# Patient Record
Sex: FEMALE | Race: WHITE | NOT HISPANIC OR LATINO
[De-identification: names, ages, dates, MRNs, and addresses within clinical notes are randomized per-mention and may not be internally consistent; named-entity substitution may affect disease eponyms.]

---

## 2017-06-20 ENCOUNTER — RX ONLY (RX ONLY)
Age: 67
End: 2017-06-20

## 2017-09-28 ENCOUNTER — RX ONLY (RX ONLY)
Age: 67
End: 2017-09-28

## 2018-08-27 ENCOUNTER — APPOINTMENT (OUTPATIENT)
Dept: URBAN - METROPOLITAN AREA CLINIC 186 | Age: 68
Setting detail: DERMATOLOGY
End: 2018-08-27

## 2018-08-27 DIAGNOSIS — L71.8 OTHER ROSACEA: ICD-10-CM

## 2018-08-27 PROCEDURE — OTHER COUNSELING: OTHER

## 2018-08-27 PROCEDURE — OTHER PRESCRIPTION: OTHER

## 2018-08-27 PROCEDURE — 99212 OFFICE O/P EST SF 10 MIN: CPT

## 2018-08-27 PROCEDURE — OTHER ADDITIONAL NOTES: OTHER

## 2018-08-27 RX ORDER — METRONIDAZOLE 7.5 MG/G
CREAM TOPICAL
Qty: 1 | Refills: 5 | Status: ERX | COMMUNITY
Start: 2018-08-27

## 2018-08-27 ASSESSMENT — LOCATION DETAILED DESCRIPTION DERM
LOCATION DETAILED: LEFT MEDIAL MALAR CHEEK
LOCATION DETAILED: RIGHT CENTRAL MALAR CHEEK

## 2018-08-27 ASSESSMENT — LOCATION SIMPLE DESCRIPTION DERM
LOCATION SIMPLE: RIGHT CHEEK
LOCATION SIMPLE: LEFT CHEEK

## 2018-08-27 ASSESSMENT — LOCATION ZONE DERM: LOCATION ZONE: FACE

## 2018-08-27 NOTE — PROCEDURE: ADDITIONAL NOTES
Additional Notes: Patient states she has only been taking oracea 3 times weekly. Patient elects to hold treatment for now, if she needs or wants a prescription she will call in. Leander she was more stressed in the past causing rosacea to flare.

## 2018-08-27 NOTE — HPI: FOLLOW UP OTHER
What Is Your Reason For Requesting A Follow-Up Appointment?: Patient states she is here for a follow up for her Rosacea.